# Patient Record
Sex: FEMALE | Race: WHITE | Employment: FULL TIME | URBAN - METROPOLITAN AREA
[De-identification: names, ages, dates, MRNs, and addresses within clinical notes are randomized per-mention and may not be internally consistent; named-entity substitution may affect disease eponyms.]

---

## 2019-04-22 ENCOUNTER — HOSPITAL ENCOUNTER (OUTPATIENT)
Dept: NUTRITION | Age: 64
Discharge: HOME | End: 2019-04-22
Payer: COMMERCIAL

## 2019-04-22 PROCEDURE — 97802 MEDICAL NUTRITION INDIV IN: CPT

## 2019-04-22 NOTE — PROGRESS NOTES
NUTRITION ASSESSMENT  Pt did not bring nutrition assessment forms filled out for appointment. Reviewed assessment information to the degree that time permitted. Pt was very talkative r/t frustrations with prior nutrition guidance and present weight which presented some deterrents from gathering all needed information. Pt referred by Dr Yael Meneses with diagnosis of obesity and hyperecholesterolemia. Medical history remarkable for hyperlipidemia and scoliosis with limiting pain.  Previous Dieting Attempts/Current Knowledge: Has attempted [employer] and a recent diet through a program offered by a chiropractor in [facility]. Describes the program with restrictions to not eat until 12:00pm everyday, no wheat or dairy, and use of supplements sold to patient for leaky gut (no diagnostic test performed per pt). States she weighs more after the attempt. Voices some basic knowledge regarding healthy food choices, but voices confusion with recent recommendations with the last program     . Lifestyle/Cultural/Family Influence: Pt states her  had a heart attack recently and that he is interesting in changing lifestyle and food choices to promote better health.  is retired. Pt continues to work and notes some stress with her job - though voices no financial concerns.  Motivation: Pt states she feels very frustrated. Reports she has \"never been this heavy. \" Notes the last program at a chiropractor's office was very expensive, confusing, and that she is now unsure what type of eating pattern/ food choices are needed to help reach a healthy weight and maintain it. States after the last program she just feels bloated and wonders if her symptoms are related to inflammation. States she wants to have reliable guidance for addressing weight concerns.      Support:  appears that he may be of support, although pt notes several times that he will comment on any food choice of hers that does not have heart-healthy qualities. (Pt states  will often say \"that's a heart-attack.)     Barriers: Food and nutrition -related knowledge deficit. Voices some stress that may lead to stress-eating.  Stage of Change (Transtheoretical Model): Behaviors indicate current stage of change is: Preparation Anticipate some steps toward compliance with recommendations. Anthropometrics:   Ht: 5'2\" (1.575m) Wt:190lb (86.4kg) per pt  BMI: 34.8 (Obesity Class I) IBW for ht: 110lb +/- 10%    BMI: 23-30 recommended when pt is 65 yr   Weight History in EMR:  1/22/18 189lb 6.4oz  3/29/17  190lb 3.2oz       Nutrition Focused Physical Findings:   N/A    Nutritionally Relevant Medications:  Reviewed. 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 States she walks through the communities when she gets a break. Notes she is otherwise very sedentary and at a desk. Notes she rides her horse several days/week. Sleep Habits:  Did not discuss. Food and Nutrient Intake:   Based on reported usual intake, pt consumes 3 meals/day with no snacks noted. Appears able to obtain appropriate nutritional choices as desired. Diet Recall:  Breakfast: @ 10:00am nutrition bar  Lunch: @ 12:00pm chicken burger and salad. Dinner: @8:30-9:00pm  cooks: heart -healthy meat and vegetables  Snacks: Pt does not note any  Beverages: Water only (amount not noted). Uses flavored water and no intake of sugar-sweetened beverages or fruit juices.      Diet Appears:   low in calcium containing choices   moderate in servings of fruits and vegetables   low in fiber   low in protein at breakfast meal   low in added sugar   moderate in heart healthy fats    Food Allergies/Intolerances: States she never has had food allergies; no anaphylactic reactions apart form reaction to ant/bee sting    Estimated Nutritional Needs to Maintain Current Wt:    EER:1789 kcal/day (MSJ with activity factor 1.3 +/- 10% margin of error; Range (9943-5209 kcal/day)  EER for weight loss: 1800 - 500 = 1300 kcal/day  Carbohydrates: 146 gm/day (45% of kcal) or 45,45,30 gm/meal(3) with 15 gm/snack(1) (Approximately 400kcal/meal with 100 kcal/snack)  Protein: 81gm/day (20% of kcal)  Fat: 43 gm/day (35% of kcal) Heart- healthy fats emphasized with lists given. Fluids: 1.3 L/day (1 ml/kcal)         NUTRITION DIAGNOSIS:   Food and nutrition-related knowledge deficit regarding appropriate food choices and eating patterns to maintain healthy weight as evidenced by pt's report of prior education proving no solid guidelines for building a healthy plate, food recall indicating imbalanced macronutrients, and low in DRIs for several nutrients. NUTRITION INTERVENTION:  Appointment length: 75 minutes. Nutrition Education:  Purpose of nutrition education: To provide pt with education to increase knowledge for healthful po intake and to provide a meal plan/ meal template as an educational resource for reference in building healthy, balanced meals and snacks. Nutrition relationship in health/disease: Explained to pt the connection between food choices and health outcomes. Explained to pt the impact of over-nutrition and excessive intake of inflammatory foods/ processed foods in the disease process for chronic disease. Recommended modifications:   · Advised pt to eat 3 balanced meals and snack for a planned po intake not longer than 4 hours apart. · Advised pt to have dinner meal before the trip to the barn and a snack following (reversing pt's present pattern which leads to excessive po intake at 8:30-9:00pm just before bed. Nutrition Education:  Skill development using MyPlate Template Meal Plan:  · Provided pt with a Plate Method meal plan and educated pt (as below) on appropriate portion sizes. Encouraged pt to use the meal plan as a teaching tool learn about food groups and appropriate portion sizes for present activity level. Advised using portions as a guide and to allow for signs of fullness leaving some food on the plate as appropriate for fullness. Advised taking at least 20 minutes for each meal to allow for enjoyment and recognition of signs of fullness (Gave Mindful Eating Techniques handout)  · Explained to pt how to use the meal planner sheets with the meal plan. Pt composed 3 sample meal in office using meal planner. The Plate Method:  · Educated pt on balancing intake of macronutrients at meals and snacks. Discussed the importance of adequate intake of all macronutrients for satiety and satisfaction. · Discussed proportions on plate: 1/2 non-starchy vegetables, 1/4 protein, 1/4 starch. Stressed the importance of healthy fat with every meal (lists given)  · Demonstrated appropriate portion sizes for various food groups and strategies for portion control. · Carbohydrates: Reviewed food groups containing CHO, provided food examples. Encouraged obtaining CHO from a variety of food groups to vary micronutrient intake, choosing less processed carbs with higher fiber. Encouraged decreasing intake of refined CHO foods/beverages with limited nutritional value and/or those high in added sugar. Reviewed list of starchy vegetables. · Protein: Reviewed food groups containing protein, provided food examples. Stressed the importance of adequate protein intake with each meal/snack to promote satiety following meals and maintenance of lean body mass. · Fat: Educated pt on importance of essential fats in diet. Advised of food sources. Explained the need to increase sources of omega 3 fats and gave products to look for. Educated pt on trans fats and harm caused with consumption. Explained how to find trans fats in ingredient lists verses rather than nutrition fact label.       Nutrition Counseling: Motivational Interviewing:    Discussed pts intake and activity patterns as above. Reviewed 3 broad areas that impact weight: food choices, physical activity level, and energy balance. Advised that weight management should be viewed as a learning process and best accomplished by making small goals that pt can continue with for life. Essentially building health lifestyle that will facilitate weight loss and maintenance.  Advised pt on the qualities of individuals who are able to lose weight and maintain that wt loss. Highlighted that maintainers tend to continue in the dietary patterns and physical activity patterns they established in order to lose the weight. Advised designing an approach with small goals that pt can practically plan on continuing for life (new behaviors) verses using a \"diet. \". Encouraged a focus on healthful intake and to avoid a focus on weight numbers.  Engaged pt in a discussion of flexibility for food choices balanced with a focus on health. Recommended an 80/20 or 90/10  approach for healthful intake verses fun foods. Self-monitoring:   · Advised pt that goals of this program are to educate pt an appropriate intake and nutrition planning for health and weight management and to address barriers to action steps. The long term goal is to develop self- monitoring, self-regulating behaviors for life-long management skills. Advised pt that lifestyle changes will usually be required. · Encouraged pt that keeping food logs for a time can help with accountability or otherwise use the template as self-accountability (one plate at a time monitoring verses end-of -day reflection)    Cognitive restructuring:   · Discussed complete health (physical, emotional, social, vocational, intellectual,  and spiritual) and how all of these areas affect each other and also can affect  eating habits when individual areas of health may need to be addressed.   · Pt voices potential barriers in voacation health that may affect attempts with behavior changes. States she can tend to stress eat and that being employed for providng needed income at this time just \"feels stressful\"- though no finical concerns expressed. Goal Setting: Pt agreed to the following goals. Goal: Pt will eat three balanced meals per day with an evening snack after her trip to the barn. (Eliminate heavy 8:80-9:00pm full meal.)  Plan: Pt will use MyPlate template meal plan along with other materials given as guides and support to structure eating in timing, food choices, macronutrient amounts, and portion sizes for goal stated (as above). Goal: Pt will increase nutrients consumed at breakfast.  Plan: Pt will use MyPlate to add more healthy fat and protein at breakfast (boiled egg, nuts)    Materials Given:  MyPlate template meal planner for portions @1300 kcal/day  Meal Planner Sheets  MIND diet recommendations for 80% of food chocies  Goal Setting- pt selected action plan form   Complete Health Details  Mindful Eating Verses Emotional Eating  Principles of Mindful Eating    MONITORING AND EVALUATION:    RECOMMENDATIONS FOR CONTINUED APPOINTMENTS/ SUPPORT:  · Pt was attentive and asked pertinent questions during appointment. It appears pt will take action steps. · Pt would benefit from continued support with appts with dietitian until action steps are established for 6 months. · Frequency depends on any setbacks or need for support. · Best practice guidelines indicate pts with most success follow up with RD:  regularly, every 2 weeks, especially when beginning new dietary changes (2013 AHA/ACC/TOS Guideline for the Management of Overweight and Obesity in Adults). Follow up appt: 5/6/19 @ 11:30am  Follow-up Monitoring Plans: Will monitor any wt change if pt agrees to weight measurement. Will assess and address any barriers to or success with plans. Will review meal recalls to compare with meal plan.     Neal Castro MS, CEDRIC,SAMANTHA, LD  W: [phone]

## 2019-06-05 ENCOUNTER — DOCUMENTATION ONLY (OUTPATIENT)
Dept: NUTRITION | Age: 64
End: 2019-06-05

## 2019-06-05 NOTE — PROGRESS NOTES
Nutrition Counseling:  Pt referred by Dr. Mira Thao. Pt advised clerical staff that she needed to call her f/u appt for 5/6/19. Stated she would call to r/s as desired. No call to date. Will close referral on this end.     Twan Gimenez MS, RD, CSSD, LD  W: [phone]

## 2024-03-22 ENCOUNTER — OFFICE VISIT (OUTPATIENT)
Dept: ORTHOPEDIC SURGERY | Age: 69
End: 2024-03-22

## 2024-03-22 ENCOUNTER — CLINICAL DOCUMENTATION (OUTPATIENT)
Dept: ORTHOPEDIC SURGERY | Age: 69
End: 2024-03-22

## 2024-03-22 VITALS — WEIGHT: 181 LBS | HEIGHT: 61 IN | BODY MASS INDEX: 34.17 KG/M2

## 2024-03-22 DIAGNOSIS — M54.9 BACK PAIN, UNSPECIFIED BACK LOCATION, UNSPECIFIED BACK PAIN LATERALITY, UNSPECIFIED CHRONICITY: ICD-10-CM

## 2024-03-22 DIAGNOSIS — M16.11 ARTHRITIS OF RIGHT HIP: ICD-10-CM

## 2024-03-22 DIAGNOSIS — M25.551 RIGHT HIP PAIN: Primary | ICD-10-CM

## 2024-03-22 RX ORDER — EPINEPHRINE 0.3 MG/.3ML
0.3 INJECTION SUBCUTANEOUS
COMMUNITY
Start: 2022-04-19

## 2024-03-22 RX ORDER — CHOLECALCIFEROL (VITAMIN D3) 1250 MCG
50000 CAPSULE ORAL DAILY
COMMUNITY

## 2024-03-22 RX ORDER — MECLIZINE HYDROCHLORIDE 25 MG/1
12.5-25 TABLET ORAL 3 TIMES DAILY PRN
COMMUNITY
Start: 2023-11-07

## 2024-03-22 RX ORDER — IBUPROFEN 800 MG/1
800 TABLET ORAL EVERY 8 HOURS PRN
COMMUNITY
Start: 2024-01-09

## 2024-03-22 RX ORDER — MAGNESIUM 30 MG
30 TABLET ORAL 2 TIMES DAILY
COMMUNITY

## 2024-03-22 NOTE — PROGRESS NOTES
Name: Karol Wei  YOB: 1955  Gender: female  MRN: [id number]  Age: 68 y.o.      Chief Complaint: Back and right hip tightness    History of Present Illness:      This is a very pleasant 68 y.o. female who presents with a history of back and right hip tightness.  She does not necessarily have pain in her back and she denies radicular pain.  She does state that she has right hip tightness with decreased range of motion.  She also states that her right hip is higher than her left hip.  She is active and exercises 5 days a week.  She does not smoke.  She does not have diabetes.  She does not take any blood thinners.  She has done physical therapy as well as massage therapy.  She also sees a chiropractor.        Medications:       Current Outpatient Medications:     EPINEPHrine (EPIPEN) 0.3 MG/0.3ML SOAJ injection, Inject 0.3 mLs into the muscle once as needed, Disp: , Rfl:     ibuprofen (ADVIL;MOTRIN) 800 MG tablet, Take 1 tablet by mouth every 8 hours as needed, Disp: , Rfl:     meclizine (ANTIVERT) 25 MG tablet, Take 0.5-1 tablets by mouth 3 times daily as needed, Disp: , Rfl:     magnesium 30 MG tablet, Take 1 tablet by mouth 2 times daily, Disp: , Rfl:     vitamin D (VITAMIN D3) 32536 UNIT CAPS, Take 1 capsule by mouth daily, Disp: , Rfl:     Allergies:    Not on File      Physical Exam:     This is a well developed well nourished female adult in no acute distress.     Mood and affect are appropriate.    Oriented to person, place, and time.    Respirations are unlabored and there is no evidence of cyanosis    Decreased range of motion of the hip.  No internal rotation.  Minimal external rotation.    Sensory testing:     L2 L3 L4 L5 S1 S2   Right 2 2 2 2 2 2   Left 2 2 2 2 2 2     0=absent; 1=altered; 2=normal; NT= not tested  Reflexes    Reflexes   Right Left   Quadriceps (L4) 2 2   Achilles (S1) 2 2     Strength testing in the lower extremity reveals the following based on the 5 point